# Patient Record
Sex: FEMALE | Race: BLACK OR AFRICAN AMERICAN | URBAN - METROPOLITAN AREA
[De-identification: names, ages, dates, MRNs, and addresses within clinical notes are randomized per-mention and may not be internally consistent; named-entity substitution may affect disease eponyms.]

---

## 2018-07-11 ENCOUNTER — OFFICE VISIT (OUTPATIENT)
Dept: ORTHOPEDIC SURGERY | Age: 68
End: 2018-07-11

## 2018-07-11 VITALS
WEIGHT: 233 LBS | BODY MASS INDEX: 39.78 KG/M2 | SYSTOLIC BLOOD PRESSURE: 137 MMHG | HEIGHT: 64 IN | HEART RATE: 63 BPM | DIASTOLIC BLOOD PRESSURE: 78 MMHG

## 2018-07-11 DIAGNOSIS — M54.12 CERVICAL NEURITIS: ICD-10-CM

## 2018-07-11 DIAGNOSIS — M50.30 DDD (DEGENERATIVE DISC DISEASE), CERVICAL: ICD-10-CM

## 2018-07-11 DIAGNOSIS — M48.02 CERVICAL STENOSIS OF SPINE: Primary | ICD-10-CM

## 2018-07-11 DIAGNOSIS — M50.20 DISPLACEMENT OF CERVICAL INTERVERTEBRAL DISC WITHOUT MYELOPATHY: ICD-10-CM

## 2018-07-11 DIAGNOSIS — M47.812 CERVICAL SPONDYLOSIS WITHOUT MYELOPATHY: ICD-10-CM

## 2018-07-11 PROBLEM — E66.01 SEVERE OBESITY (BMI 35.0-39.9): Status: ACTIVE | Noted: 2018-07-11

## 2018-07-11 RX ORDER — GABAPENTIN 100 MG/1
100 CAPSULE ORAL 3 TIMES DAILY
Qty: 90 CAP | Refills: 1 | Status: SHIPPED | OUTPATIENT
Start: 2018-07-11

## 2018-07-11 NOTE — MR AVS SNAPSHOT
[address] [facility] 
 
 
 [address] 706 [facility] 
[phone] Patient: Maggie Caceres MRN: [id number] :1950 Visit Information Date & Time Provider Department Dept. Phone Encounter #  
 2018 10:35 AM Jose Mar  [facility], [address] and [facility] [place] [id number] Follow-up Instructions Return in about 4 weeks (around 2018). Upcoming Health Maintenance Date Due Hepatitis C Screening 1950 DTaP/Tdap/Td series (1 - Tdap) 1969 BREAST CANCER SCRN MAMMOGRAM 1998 FOBT Q 1 YEAR AGE 50-75 1998 ZOSTER VACCINE AGE 60> 10/24/2008 GLAUCOMA SCREENING Q2Y 2013 Bone Densitometry (Dexa) Screening 2013 Pneumococcal 65+ Low/Medium Risk (1 of 2 - PCV13) 2013 MEDICARE YEARLY EXAM 2018 Influenza Age 5 to Adult 2018 Allergies as of 2018  Review Complete On: 2018 By: Jose Mar MD  
 No Known Allergies Current Immunizations  Never Reviewed No immunizations on file. Not reviewed this visit You Were Diagnosed With   
  
 Codes Comments Cervical stenosis of spine    -  Primary ICD-10-CM: M48.02 
ICD-9-CM: 723.0 Displacement of cervical intervertebral disc without myelopathy     ICD-10-CM: M50.20 ICD-9-CM: 722.0 Cervical spondylosis without myelopathy     ICD-10-CM: H76.958 ICD-9-CM: 721.0   
 DDD (degenerative disc disease), cervical     ICD-10-CM: M50.30 ICD-9-CM: 722.4 Cervical neuritis     ICD-10-CM: M54.12 
ICD-9-CM: 723.4 Vitals BP Pulse Height(growth percentile) Weight(growth percentile) BMI Smoking Status 137/78 63 5' 4\" (1.626 m) 233 lb (105.7 kg) 39.99 kg/m2 Former Smoker Vitals History BMI and BSA Data Body Mass Index Body Surface Area  
 39.99 kg/m 2 2.18 m 2 Preferred Pharmacy Pharmacy Name Phone [facility] [address] [phone] Your Updated Medication List  
  
   
This list is accurate as of 7/11/18 12:19 PM.  Always use your most recent med list. ALLEGRA PO Take  by mouth. aspirin-calcium carbonate 81 mg-300 mg calcium(777 mg) Tab 81 mg. FLONASE NA  
by Nasal route.  
  
 gabapentin 100 mg capsule Commonly known as:  NEURONTIN Take 1 Cap by mouth three (3) times daily. JANUMET PO Take  by mouth.  
  
 levothyroxine 100 mcg tablet Commonly known as:  SYNTHROID Take  by mouth Daily (before breakfast). pravastatin 20 mg tablet Commonly known as:  PRAVACHOL Take 20 mg by mouth nightly. valsartan-hydroCHLOROthiazide 80-12.5 mg per tablet Commonly known as:  DIOVAN-HCT Take 1 Tab by mouth daily. VITAMIN D3 PO Take  by mouth. Prescriptions Sent to Pharmacy Refills  
 gabapentin (NEURONTIN) 100 mg capsule 1 Sig: Take 1 Cap by mouth three (3) times daily. Class: Normal  
 Pharmacy: [place] [address] Ph #: [phone] Route: Oral  
  
We Performed the Following REFERRAL TO PHYSICAL THERAPY [DBI30 Custom] Comments:  
 DX: eval and treat CSP LUE. NO TRACTION 
HEP 
LOCATION: [facility] 2-3 visits/ 2-3 weeks Follow-up Instructions Return in about 4 weeks (around 8/8/2018). Referral Information Referral ID Referred By Referred To  
  
 [id number] Diane Crawford Not Available Visits Status Start Date End Date 1 New Request 7/11/18 7/11/19 If your referral has a status of pending review or denied, additional information will be sent to support the outcome of this decision. Introducing [facility]! Freddy Cramer introduces [employer] patient portal. Now you can access parts of your medical record, email your doctor's office, and request medication refills online. 1. In your internet browser, go to [email or web address] 2. Click on the First Time User? Click Here link in the Sign In box. You will see the New Member Sign Up page. 3. Enter your [employer] Access Code exactly as it appears below. You will not need to use this code after youve completed the sign-up process. If you do not sign up before the expiration date, you must request a new code. · [employer] Access Code: [email or web address] Expires: 10/8/2018  2:43 PM 
 
4. Enter the last four digits of your Social Security Number (xxxx) and Date of Birth (mm/dd/yyyy) as indicated and click Submit. You will be taken to the next sign-up page. 5. Create a [facility].t ID. This will be your [employer].com login ID and cannot be changed, so think of one that is secure and easy to remember. 6. Create a [employer] password. You can change your password at any time. 7. Enter your Password Reset Question and Answer. This can be used at a later time if you forget your password. 8. Enter your e-mail address. You will receive e-mail notification when new information is available in [address] 9. Click Sign Up. You can now view and download portions of your medical record. 10. Click the Download Summary menu link to download a portable copy of your medical information. If you have questions, please visit the Frequently Asked Questions section of the [employer] website. Remember, [employer] is NOT to be used for urgent needs. For medical emergencies, dial 911. Now available from your iPhone and Android! Please provide this summary of care documentation to your next provider. Your primary care clinician is listed as Meme Borges. If you have any questions after today's visit, please call [phone].

## 2018-07-11 NOTE — PROGRESS NOTES
[facility]  [address], [address]  Phone: [phone]  Fax: [phone]        INITIAL CONSULTATION      HISTORY OF PRESENT ILLNESS:  Afia Bradford is a 69 y.o. female whom is self-referred secondary to a recurrence of neck pain intermittently extending into the LUE into all digits since April or May. She rates her pain 6/10. She was last seen by me 8/25/15 with c/o neck pain into the LUE into digits 4 and 5. At that time, her pain was well relieved with Neurontin. She was scheduled to f/u in 6 months time but failed to do so. She denies specific injury or trauma. She denies a hx of cervical surgery. She cannot recall if she has had PT. She treated with Tylenol extra strength prn with some relief. PmHx The patient has a history of DM and reports blood sugars are well controlled, consistently remaining below 200. Pt denies dropping things or loss of balance. Pt denies change in bowel or bladder habits. Pt denies fever, weight loss, or skin changes. The patient is RHD. Lumbar spine MRI dated 8/21/17 reviewed. Per report, severe multilevel spondylosis worse at C3-C4 where there is mildly progressive high-grade central canal and bilateral foraminal stenosis. No cord signal abnormality. Additional high-grade foraminal stenosis on the right at C4-C5, bilaterally at C5-C6, and on the left at C6-C7 as well as additional areas of moderate foraminal stenosis as discussed.  reviewed. Body mass index is 39.99 kg/(m^2).     PCP: Roman Pringle MD    Past Medical History:   Diagnosis Date    Arthritis     Asthma     Cervical radiculopathy     Diabetes ([facility] [address]     Glaucoma     Hypercholesterolemia     Hypertension     Neck pain     Thyroid disease           Past Surgical History:   Procedure Laterality Date    HX HYSTERECTOMY      HX OTHER SURGICAL Bilateral     Cataract surgery    HX TUBAL LIGATION           Social History   Substance Use Topics    Smoking status: Former Smoker     Quit date: 1/1/1997    Smokeless tobacco: Never Used    Alcohol use No     Work status: The patient is N/A. Marital status: The patient is . Current Outpatient Prescriptions   Medication Sig Dispense Refill    cholecalciferol, vitamin D3, (VITAMIN D3 PO) Take  by mouth.  aspirin-calcium carbonate 81 mg-300 mg calcium(777 mg) tab 81 mg.      gabapentin (NEURONTIN) 100 mg capsule Take 1 Cap by mouth three (3) times daily. 90 Cap 1    valsartan-hydrochlorothiazide (DIOVAN-HCT) 80-12.5 mg per tablet Take 1 Tab by mouth daily.  pravastatin (PRAVACHOL) 20 mg tablet Take 20 mg by mouth nightly.  levothyroxine (SYNTHROID) 100 mcg tablet Take  by mouth Daily (before breakfast).  SITAGLIPTIN PHOS/METFORMIN HCL (JANUMET PO) Take  by mouth.  FLUTICASONE PROPIONATE (FLONASE NA) by Nasal route.  FEXOFENADINE HCL (ALLEGRA PO) Take  by mouth. No Known Allergies         Family History   Problem Relation Age of Onset    COPD Other     Pulmonary Fibrosis Other          REVIEW OF SYSTEMS  Constitutional symptoms: Negative  Eyes: Negative  Ears, Nose, Throat, and Mouth: Negative  Cardiovascular: Negative  Respiratory: Negative  Genitourinary: Negative  Integumentary (Skin and/or breast): Negative  Musculoskeletal: Positive for neck pain, intermittent LUE pain into all digits of the left hand  Extremities: Negative for edema. Endocrine/Rheumatologic: Negative  Hematologic/Lymphatic: Negative  Allergic/Immunologic: Negative  Psychiatric: Negative       PHYSICAL EXAMINATION    Visit Vitals    /78    Pulse 63    Ht 5' 4\" (1.626 m)    Wt 105.7 kg (233 lb)    BMI 39.99 kg/m2       CONSTITUTIONAL: NAD, A&O x 3  HEART: Regular rate and rhythm  ABDOMEN: Positive bowel sounds, soft, nontender, and nondistended  LUNGS: Clear to auscultation bilaterally. SKIN: Negative for rash.   RANGE OF MOTION: The patient has full passive range of motion in all four extremities. SENSATION: Sensation is intact to light touch throughout. MOTOR:   Straight Leg Raise: Negative, bilateral  Grey: Negative, bilateral  Tandem Gait: Neg. Deep tendon reflexes are 2+ at the brachioradialis, biceps, and triceps. Deep tendon reflexes are 2+ at the knees and ankles bilaterally. Shoulder AB/Flex Elbow Flex Wrist Ext Elbow Ext Wrist Flex Hand Intrin Tone   Right +4/5 +4/5 +4/5 +4/5 +4/5 +4/5 +4/5   Left +4/5 +4/5 +4/5 +4/5 +4/5 +4/5 +4/5              Hip Flex Knee Ext Knee Flex Ankle DF GTE Ankle PF Tone   Right +4/5 +4/5 +4/5 +4/5 +4/5 +4/5 +4/5   Left +4/5 +4/5 +4/5 +4/5 +4/5 +4/5 +4/5         ASSESSMENT   Diagnoses and all orders for this visit:    1. Cervical stenosis of spine  -     REFERRAL TO PHYSICAL THERAPY    2. Displacement of cervical intervertebral disc without myelopathy  -     REFERRAL TO PHYSICAL THERAPY    3. Cervical spondylosis without myelopathy  -     REFERRAL TO PHYSICAL THERAPY    4. DDD (degenerative disc disease), cervical  -     REFERRAL TO PHYSICAL THERAPY    5. Cervical neuritis  -     REFERRAL TO PHYSICAL THERAPY    Other orders  -     gabapentin (NEURONTIN) 100 mg capsule; Take 1 Cap by mouth three (3) times daily. IMPRESSIONS/RECOMMENDATIONS:  Patient presents today with a recurrence of chronic neck pain intermittently extending into the LUE into all digits x a few months. I will restart her on Neurontin 100 mg TID. Patient advised to call the office if intolerant to medication. Patient is neurologically intact. Multiple treatment options were discussed. Patient is not interested in surgical intervention at this time. I will refer her to physical therapy with an emphasis on HEP (No traction). I will see the patient back in 1 month's time or earlier if needed. Written by Raul Bucio, as dictated by Anuja Mtz MD  I examined the patient, reviewed and agree with the note.

## 2018-08-14 ENCOUNTER — OFFICE VISIT (OUTPATIENT)
Dept: ORTHOPEDIC SURGERY | Age: 68
End: 2018-08-14

## 2018-08-14 VITALS
HEIGHT: 64 IN | OXYGEN SATURATION: 97 % | HEART RATE: 67 BPM | BODY MASS INDEX: 40.63 KG/M2 | WEIGHT: 238 LBS | SYSTOLIC BLOOD PRESSURE: 134 MMHG | DIASTOLIC BLOOD PRESSURE: 66 MMHG

## 2018-08-14 DIAGNOSIS — M50.30 DDD (DEGENERATIVE DISC DISEASE), CERVICAL: ICD-10-CM

## 2018-08-14 DIAGNOSIS — M48.02 CERVICAL STENOSIS OF SPINE: Primary | ICD-10-CM

## 2018-08-14 DIAGNOSIS — M47.812 CERVICAL SPONDYLOSIS WITHOUT MYELOPATHY: ICD-10-CM

## 2018-08-14 DIAGNOSIS — M50.20 DISPLACEMENT OF CERVICAL INTERVERTEBRAL DISC WITHOUT MYELOPATHY: ICD-10-CM

## 2018-08-14 RX ORDER — GABAPENTIN 100 MG/1
100 CAPSULE ORAL 3 TIMES DAILY
Qty: 270 CAP | Refills: 1 | Status: SHIPPED | OUTPATIENT
Start: 2018-08-14

## 2018-08-14 RX ORDER — GUAIFENESIN 100 MG/5ML
81 LIQUID (ML) ORAL DAILY
COMMUNITY

## 2018-08-14 NOTE — MR AVS SNAPSHOT
[address] [facility] 
 
 
 [address] 200 [facility] 
[phone] Patient: Dusty Lemos MRN: [id number] :1950 Visit Information Date & Time Provider Department Dept. Phone Encounter #  
 2018  9:50 AM Frederick Gonzales MD 4 [facility], [address] and [facility] [place] [phone] Follow-up Instructions Return in about 6 months (around 2019). Upcoming Health Maintenance Date Due Hepatitis C Screening 1950 DTaP/Tdap/Td series (1 - Tdap) 1969 BREAST CANCER SCRN MAMMOGRAM 1998 FOBT Q 1 YEAR AGE 50-75 1998 ZOSTER VACCINE AGE 60> 10/24/2008 GLAUCOMA SCREENING Q2Y 2013 Bone Densitometry (Dexa) Screening 2013 Pneumococcal 65+ Low/Medium Risk (1 of 2 - PCV13) 2013 MEDICARE YEARLY EXAM 2018 Influenza Age 5 to Adult 2018 Allergies as of 2018  Review Complete On: 2018 By: Frederick Gonzales MD  
 No Known Allergies Current Immunizations  Never Reviewed No immunizations on file. Not reviewed this visit You Were Diagnosed With   
  
 Codes Comments Cervical stenosis of spine    -  Primary ICD-10-CM: M48.02 
ICD-9-CM: 723.0 Displacement of cervical intervertebral disc without myelopathy     ICD-10-CM: M50.20 ICD-9-CM: 722.0 Cervical spondylosis without myelopathy     ICD-10-CM: F37.769 ICD-9-CM: 721.0   
 DDD (degenerative disc disease), cervical     ICD-10-CM: M50.30 ICD-9-CM: 722.4 Vitals BP Pulse Height(growth percentile) Weight(growth percentile) SpO2 BMI  
 134/66 67 5' 4\" (1.626 m) 238 lb (108 kg) 97% 40.85 kg/m2 Smoking Status Former Smoker BMI and BSA Data Body Mass Index Body Surface Area  
 40.85 kg/m 2 2.21 m 2 Preferred Pharmacy Pharmacy Name Phone  [address], [address] [phone] Your Updated Medication List  
  
   
This list is accurate as of 8/14/18 10:17 AM.  Always use your most recent med list. ALLEGRA PO Take  by mouth. aspirin 81 mg chewable tablet Take 81 mg by mouth daily. aspirin-calcium carbonate 81 mg-300 mg calcium(777 mg) Tab 81 mg. FLONASE NA  
by Nasal route. * gabapentin 100 mg capsule Commonly known as:  NEURONTIN Take 1 Cap by mouth three (3) times daily. * gabapentin 100 mg capsule Commonly known as:  NEURONTIN Take 1 Cap by mouth three (3) times daily. JANUMET PO Take  by mouth.  
  
 levothyroxine 100 mcg tablet Commonly known as:  SYNTHROID Take  by mouth Daily (before breakfast). pravastatin 20 mg tablet Commonly known as:  PRAVACHOL Take 20 mg by mouth nightly. valsartan-hydroCHLOROthiazide 80-12.5 mg per tablet Commonly known as:  DIOVAN-HCT Take 1 Tab by mouth daily. VITAMIN D3 PO Take  by mouth. * Notice: This list has 2 medication(s) that are the same as other medications prescribed for you. Read the directions carefully, and ask your doctor or other care provider to review them with you. Prescriptions Sent to Pharmacy Refills  
 gabapentin (NEURONTIN) 100 mg capsule 1 Sig: Take 1 Cap by mouth three (3) times daily. Class: Normal  
 Pharmacy: [address] [place] [address], [address] [place] [address] #: [phone] Route: Oral  
  
Follow-up Instructions Return in about 6 months (around 2/14/2019). Introducing [facility]! Esmer Valderrama introduces [employer] patient portal. Now you can access parts of your medical record, email your doctor's office, and request medication refills online. 1. In your internet browser, go to [email or web address] 2. Click on the First Time User? Click Here link in the Sign In box. You will see the New Member Sign Up page. 3. Enter your [employer] Access Code exactly as it appears below. You will not need to use this code after youve completed the sign-up process. If you do not sign up before the expiration date, you must request a new code. · [employer] Access Code: [email or web address] Expires: 10/8/2018  2:43 PM 
 
4. Enter the last four digits of your Social Security Number (xxxx) and Date of Birth (mm/dd/yyyy) as indicated and click Submit. You will be taken to the next sign-up page. 5. Create a [employer] ID. This will be your [employer] login ID and cannot be changed, so think of one that is secure and easy to remember. 6. Create a [employer] password. You can change your password at any time. 7. Enter your Password Reset Question and Answer. This can be used at a later time if you forget your password. 8. Enter your e-mail address. You will receive e-mail notification when new information is available in [address] 9. Click Sign Up. You can now view and download portions of your medical record. 10. Click the Download Summary menu link to download a portable copy of your medical information. If you have questions, please visit the Frequently Asked Questions section of the [employer] website. Remember, [employer] is NOT to be used for urgent needs. For medical emergencies, dial 911. Now available from your iPhone and Android! Please provide this summary of care documentation to your next provider. Your primary care clinician is listed as Anjelica Hansen. If you have any questions after today's visit, please call [phone].

## 2018-08-14 NOTE — PROGRESS NOTES
[facility]  [address], [place] [address]  Phone: [phone]  Fax: [phone]        PROGRESS NOTE      HISTORY OF PRESENT ILLNESS:  The patient is a 69 y.o. female and was seen today for follow up of recurrence of neck pain intermittently extending into the LUE into all digits since April or May. She was last seen by me 8/25/15 with c/o neck pain into the LUE into digits 4 and 5. At that time, her pain was well relieved with Neurontin. She was scheduled to f/u in 6 months time but failed to do so. She denies specific injury or trauma. She denies a hx of cervical surgery. She cannot recall if she has had PT. She treated with Tylenol extra strength prn with some relief. PmHx The patient has a history of DM and reports blood sugars are well controlled, consistently remaining below 200. Pt denies dropping things or loss of balance. Pt denies change in bowel or bladder habits. Pt denies fever, weight loss, or skin changes. The patient is RHD. Lumbar spine MRI dated 8/21/17 reviewed. Per report, severe multilevel spondylosis worse at C3-C4 where there is mildly progressive high-grade central canal and bilateral foraminal stenosis. No cord signal abnormality. Additional high-grade foraminal stenosis on the right at C4-C5, bilaterally at C5-C6, and on the left at C6-C7 as well as additional areas of moderate foraminal stenosis as discussed. At her last clinical appointment, patient presented with a recurrence of chronic neck pain intermittently extending into the LUE into all digits x a few months. I restarted her on Neurontin 100 mg TID. I referred her to physical therapy with an emphasis on HEP (No traction). The patient returns today with pain localized to her neck. She denies radicular symptoms into the LUE at this time. She rates her pain 2/10, a decrease from her last visit (6/10). She reports that her neck pain is also more tolerable.  She is tolerating Neurontin 100 mg TID with some relief. She completed PT with good benefit. She is completing her HEP daily. She reports occasional mild LOB but states it is not progressive.  reviewed. Body mass index is 40.85 kg/(m^2). PCP: Rosina Cortes MD      Past Medical History:   Diagnosis Date    Arthritis     Asthma     Cervical radiculopathy     Diabetes ([address]     Glaucoma     Hypercholesterolemia     Hypertension     Neck pain     Thyroid disease         Social History     Social History    Marital status:      Spouse name: N/A    Number of children: N/A    Years of education: N/A     Occupational History    Not on file. Social History Main Topics    Smoking status: Former Smoker     Quit date: 1/1/1997    Smokeless tobacco: Never Used    Alcohol use No    Drug use: Not on file    Sexual activity: Not on file     Other Topics Concern    Not on file     Social History Narrative       Current Outpatient Prescriptions   Medication Sig Dispense Refill    aspirin 81 mg chewable tablet Take 81 mg by mouth daily.  gabapentin (NEURONTIN) 100 mg capsule Take 1 Cap by mouth three (3) times daily. 270 Cap 1    cholecalciferol, vitamin D3, (VITAMIN D3 PO) Take  by mouth.  aspirin-calcium carbonate 81 mg-300 mg calcium(777 mg) tab 81 mg.      gabapentin (NEURONTIN) 100 mg capsule Take 1 Cap by mouth three (3) times daily. 90 Cap 1    valsartan-hydrochlorothiazide (DIOVAN-HCT) 80-12.5 mg per tablet Take 1 Tab by mouth daily.  pravastatin (PRAVACHOL) 20 mg tablet Take 20 mg by mouth nightly.  levothyroxine (SYNTHROID) 100 mcg tablet Take  by mouth Daily (before breakfast).  SITAGLIPTIN PHOS/METFORMIN HCL (JANUMET PO) Take  by mouth.  FLUTICASONE PROPIONATE (FLONASE NA) by Nasal route.  FEXOFENADINE HCL (ALLEGRA PO) Take  by mouth.          No Known Allergies       PHYSICAL EXAMINATION    Visit Vitals    /66    Pulse 67    Ht 5' 4\" (1.626 m)  Wt 108 kg (238 lb)    SpO2 97%    BMI 40.85 kg/m2       CONSTITUTIONAL: NAD, A&O x 3  SENSATION: Intact to light touch throughout  NEURO: Anup's is negative bilaterally. RANGE OF MOTION: The patient has full passive range of motion in all four extremities. Shoulder AB/Flex Elbow Flex Wrist Ext Elbow Ext Wrist Flex Hand Intrin Tone   Right +4/5 +4/5 +4/5 +4/5 +4/5 +4/5 +4/5   Left +4/5 +4/5 +4/5 +4/5 +4/5 +4/5 +4/5                 ASSESSMENT   Diagnoses and all orders for this visit:    1. Cervical stenosis of spine    2. Displacement of cervical intervertebral disc without myelopathy    3. Cervical spondylosis without myelopathy    4. DDD (degenerative disc disease), cervical    Other orders  -     gabapentin (NEURONTIN) 100 mg capsule; Take 1 Cap by mouth three (3) times daily. IMPRESSION AND PLAN:  Patient wished to continue her current treatment. I provided her with refills of her Neurontin 100 mg TID. Patient is neurologically intact. I recommend she continue with her HEP daily. Patient is not interested in surgical intervention at this time. I will see the patient back in 6 month's time or earlier if needed. Written by Sandra Lacy, as dictated by Radha Carson MD  I examined the patient, reviewed and agree with the note.

## 2019-02-15 ENCOUNTER — OFFICE VISIT (OUTPATIENT)
Dept: ORTHOPEDIC SURGERY | Age: 69
End: 2019-02-15

## 2019-02-15 VITALS
WEIGHT: 243 LBS | RESPIRATION RATE: 16 BRPM | OXYGEN SATURATION: 97 % | BODY MASS INDEX: 41.48 KG/M2 | DIASTOLIC BLOOD PRESSURE: 87 MMHG | HEART RATE: 69 BPM | HEIGHT: 64 IN | SYSTOLIC BLOOD PRESSURE: 138 MMHG | TEMPERATURE: 96.1 F

## 2019-02-15 DIAGNOSIS — M47.812 CERVICAL SPONDYLOSIS WITHOUT MYELOPATHY: ICD-10-CM

## 2019-02-15 DIAGNOSIS — M50.30 DDD (DEGENERATIVE DISC DISEASE), CERVICAL: ICD-10-CM

## 2019-02-15 DIAGNOSIS — M50.20 DISPLACEMENT OF CERVICAL INTERVERTEBRAL DISC WITHOUT MYELOPATHY: ICD-10-CM

## 2019-02-15 DIAGNOSIS — M25.562 ACUTE PAIN OF LEFT KNEE: Primary | ICD-10-CM

## 2019-02-15 DIAGNOSIS — M48.02 CERVICAL STENOSIS OF SPINE: ICD-10-CM

## 2019-02-15 RX ORDER — GABAPENTIN 100 MG/1
100 CAPSULE ORAL 3 TIMES DAILY
Qty: 270 CAP | Refills: 1 | Status: SHIPPED | OUTPATIENT
Start: 2019-02-15

## 2019-02-15 NOTE — PROGRESS NOTES
1. Have you been to the ER, urgent care clinic since your last visit? Hospitalized since your last visit? No 
 
2. Have you seen or consulted any other health care providers outside of the [address] [place] [address] since your last visit? Include any pap smears or colon screening. Yes When: 1/24/19, colonoscopy, [facility]

## 2019-02-15 NOTE — PROGRESS NOTES
[facility] 
[address] Phone: [phone] Fax: [phone] PROGRESS NOTE HISTORY OF PRESENT ILLNESS: 
The patient is a 77 y.o. female and was seen today for follow up of pain localized to her neck. She was initially seen for neck pain intermittently extending into the LUE into all digits since April or May. She was last seen by me 8/25/15 with c/o neck pain into the LUE into digits 4 and 5. At that time, her pain was well relieved with Neurontin. She was scheduled to f/u in 6 months time but failed to do so. She denies specific injury or trauma. She denies a hx of cervical surgery. She completed PT with good benefit. She treated with Tylenol extra strength prn with some relief. PmHx The patient has a history of DM and reports blood sugars are well controlled, consistently remaining below 200. Pt denies dropping things or loss of balance. Pt denies change in bowel or bladder habits. Pt denies fever, weight loss, or skin changes. The patient is RHD. Lumbar spine MRI dated 8/21/17 reviewed. Per report, severe multilevel spondylosis worse at C3-C4 where there is mildly progressive high-grade central canal and bilateral foraminal stenosis. No cord signal abnormality. Additional high-grade foraminal stenosis on the right at C4-C5, bilaterally at C5-C6, and on the left at C6-C7 as well as additional areas of moderate foraminal stenosis as discussed. At her last clinical appointment, patient wished to continue her current treatment. I provided her with refills of her Neurontin 100 mg TID. The patient returns today with pain location and distribution remain unchanged. She rates her pain 0-2/10, consistent with her last visit (2/10). She additionally endorses progressive left knee pain x3-4 months at this time. Last seen by me 8/14/18. Pt admits inconsistency with her HEP (1-2x/week). She is compliant with Neurontin 100 mg TID.  She reports occasional mild LOB but states it is not progressive. Pt denies falls.  reviewed. Body mass index is 41.71 kg/m². PCP: Ta Lopez MD 
 
 
Past Medical History:  
Diagnosis Date  Arthritis  Asthma  Cervical radiculopathy  Diabetes ([address]  Glaucoma  Hypercholesterolemia  Hypertension  Neck pain  Thyroid disease Social History Socioeconomic History  Marital status:  Spouse name: Not on file  Number of children: Not on file  Years of education: Not on file  Highest education level: Not on file Social Needs  Financial resource strain: Not on file  Food insecurity - worry: Not on file  Food insecurity - inability: Not on file  Transportation needs - medical: Not on file  Transportation needs - non-medical: Not on file Occupational History  Not on file Tobacco Use  Smoking status: Former Smoker Last attempt to quit: 1997 Years since quittin.1  Smokeless tobacco: Never Used Substance and Sexual Activity  Alcohol use: No  
 Drug use: Not on file  Sexual activity: Not on file Other Topics Concern [address] Service Not Asked  Blood Transfusions Not Asked  Caffeine Concern Not Asked  Occupational Exposure Not Asked Fabio Marek Hazards Not Asked  Sleep Concern Not Asked  Stress Concern Not Asked  Weight Concern Not Asked  Special Diet Not Asked  Back Care Not Asked  Exercise Not Asked  Bike Helmet Not Asked  Seat Belt Not Asked  Self-Exams Not Asked Social History Narrative  Not on file Current Outpatient Medications Medication Sig Dispense Refill  aspirin 81 mg chewable tablet Take 81 mg by mouth daily.  gabapentin (NEURONTIN) 100 mg capsule Take 1 Cap by mouth three (3) times daily. 270 Cap 1  cholecalciferol, vitamin D3, (VITAMIN D3 PO) Take  by mouth.  valsartan-hydrochlorothiazide (DIOVAN-HCT) 80-12.5 mg per tablet Take 1 Tab by mouth daily.  pravastatin (PRAVACHOL) 20 mg tablet Take 20 mg by mouth nightly.  levothyroxine (SYNTHROID) 100 mcg tablet Take  by mouth Daily (before breakfast).  SITAGLIPTIN PHOS/METFORMIN HCL (JANUMET PO) Take  by mouth.  FLUTICASONE PROPIONATE (FLONASE NA) by Nasal route.  FEXOFENADINE HCL (ALLEGRA PO) Take  by mouth.  aspirin-calcium carbonate 81 mg-300 mg calcium(777 mg) tab 81 mg.    
 gabapentin (NEURONTIN) 100 mg capsule Take 1 Cap by mouth three (3) times daily. 90 Cap 1 No Known Allergies PHYSICAL EXAMINATION Visit Vitals /87 Pulse 69 Temp 96.1 °F (35.6 °C) (Oral) Resp 16 Ht 5' 4\" (1.626 m) Wt 243 lb (110.2 kg) SpO2 97% BMI 41.71 kg/m² CONSTITUTIONAL: NAD, A&O x 3 SENSATION: Intact to light touch throughout. Mild tenderness to palpation of medial joint line of left knee. NEURO: Anup's is negative bilaterally. RANGE OF MOTION: The patient has full passive range of motion in all four extremities. Tandem gait: No LOB Shoulder AB/Flex Elbow Flex Wrist Ext Elbow Ext Wrist Flex Hand Intrin Tone Right +4/5 +4/5 +4/5 +4/5 +4/5 +4/5 +4/5 Left +4/5 +4/5 +4/5 +4/5 +4/5 +4/5 +4/5 ASSESSMENT Diagnoses and all orders for this visit: 
 
1. Acute pain of left knee 2. Cervical stenosis of spine 3. DDD (degenerative disc disease), cervical 
 
4. Cervical spondylosis without myelopathy 5. Displacement of cervical intervertebral disc without myelopathy IMPRESSION AND PLAN: 
Patient wished to continue her current treatment. I provided her with refills of Neurontin 100 mg TID. I advised patient to monitor her LOB and notify the office if it worsens. I recommend she increase the frequency of HEP to daily.  I offered to refer her to an orthopedist for evaluation of her left knee pain, but she declined. Patient is neurologically intact. I will see the patient back in 6 month's time or earlier if needed. Written by Elizabeth Sultana, as dictated by Gordo Gonzalez MD 
I examined the patient, reviewed and agree with the note.

## 2019-08-13 ENCOUNTER — OFFICE VISIT (OUTPATIENT)
Dept: ORTHOPEDIC SURGERY | Age: 69
End: 2019-08-13

## 2019-08-13 VITALS
TEMPERATURE: 95.8 F | OXYGEN SATURATION: 97 % | DIASTOLIC BLOOD PRESSURE: 75 MMHG | HEART RATE: 58 BPM | HEIGHT: 64 IN | WEIGHT: 244 LBS | SYSTOLIC BLOOD PRESSURE: 179 MMHG | BODY MASS INDEX: 41.66 KG/M2

## 2019-08-13 DIAGNOSIS — M48.02 CERVICAL STENOSIS OF SPINE: Primary | ICD-10-CM

## 2019-08-13 DIAGNOSIS — M47.812 CERVICAL SPONDYLOSIS WITHOUT MYELOPATHY: ICD-10-CM

## 2019-08-13 DIAGNOSIS — M50.30 DDD (DEGENERATIVE DISC DISEASE), CERVICAL: ICD-10-CM

## 2019-08-13 RX ORDER — GABAPENTIN 100 MG/1
100 CAPSULE ORAL 3 TIMES DAILY
Qty: 270 CAP | Refills: 1 | Status: SHIPPED | OUTPATIENT
Start: 2019-08-13

## 2019-08-13 RX ORDER — LOSARTAN POTASSIUM 25 MG/1
TABLET ORAL
COMMUNITY
Start: 2019-03-23

## 2019-08-13 NOTE — LETTER
8/13/19 Patient: Faroese Cargo YOB: 1950 Date of Visit: 8/13/2019 Lito Sharp MD 
[address]  [address] [facility] [address] VIA Facsimile: [phone] Dear Lito Sharp MD, Thank you for referring Ms. Jamee Tam to [address] [place] [address] for evaluation. My notes for this consultation are attached. If you have questions, please do not hesitate to call me. I look forward to following your patient along with you. Sincerely, Krupa Sellers MD

## 2019-08-13 NOTE — PROGRESS NOTES
[facility]  [address]  Phone: [phone]  Fax: [phone]        PROGRESS NOTE      HISTORY OF PRESENT ILLNESS:  The patient is a 77 y.o. female and was seen today for follow up of pain localized to her neck. She was initially seen for neck pain intermittently extending into the LUE into all digits since April or May. She was last seen by me 8/25/15 with c/o neck pain into the LUE into digits 4 and 5. At that time, her pain was well relieved with Neurontin. She was scheduled to f/u in 6 months time but failed to do so. She denies specific injury or trauma. She denies a hx of cervical surgery. She completed PT with good benefit. She treated with Tylenol extra strength prn with some relief. PmHx The patient has a history of DM and reports blood sugars are well controlled, consistently remaining below 200. Pt denies dropping things or loss of balance. Pt denies change in bowel or bladder habits. Pt denies fever, weight loss, or skin changes. The patient is RHD. Lumbar spine MRI dated 8/21/17 reviewed. Per report, severe multilevel spondylosis worse at C3-C4 where there is mildly progressive high-grade central canal and bilateral foraminal stenosis. No cord signal abnormality. Additional high-grade foraminal stenosis on the right at C4-C5, bilaterally at C5-C6, and on the left at C6-C7 as well as additional areas of moderate foraminal stenosis as discussed. At her last clinical appointment, patient wished to continue her current treatment. I provided her with refills of Neurontin 100 mg TID. I advised patient to monitor her LOB and notify the office if it worsens. I recommended she increase the frequency of HEP to daily. I offered to refer her to an orthopedist for evaluation of her left knee pain, but she declined. The patient returns today with pain location and distribution remain unchanged. She rates her pain 0/10, previously 0-2/10.  She is complaint with Neurontin 100 mg TID. Patient reports she has been taking OTC Naprosyn every day/every other day. Patient is performing her HEP inconsistently. She continues to report occasional mild LOB. Pt denies falls.  reviewed. Body mass index is 41.88 kg/m².       PCP: Chuck Correa MD      Past Medical History:   Diagnosis Date    Arthritis     Asthma     Cervical radiculopathy     Diabetes ([address]     Glaucoma     Hypercholesterolemia     Hypertension     Neck pain     Thyroid disease         Social History     Socioeconomic History    Marital status:      Spouse name: Not on file    Number of children: Not on file    Years of education: Not on file    Highest education level: Not on file   Occupational History    Not on file   Social Needs    Financial resource strain: Not on file    Food insecurity:     Worry: Not on file     Inability: Not on file    Transportation needs:     Medical: Not on file     Non-medical: Not on file   Tobacco Use    Smoking status: Former Smoker     Last attempt to quit: 1997     Years since quittin.6    Smokeless tobacco: Never Used   Substance and Sexual Activity    Alcohol use: No    Drug use: Not on file    Sexual activity: Not on file   Lifestyle    Physical activity:     Days per week: Not on file     Minutes per session: Not on file    Stress: Not on file   Relationships    Social connections:     Talks on phone: Not on file     Gets together: Not on file     Attends [employer] service: Not on file     Active member of club or organization: Not on file     Attends meetings of clubs or organizations: Not on file     Relationship status: Not on file    Intimate partner violence:     Fear of current or ex partner: Not on file     Emotionally abused: Not on file     Physically abused: Not on file     Forced sexual activity: Not on file   Other Topics Concern   [address] Service Not Asked    Blood Transfusions Not Asked   Yeni Vaz Caffeine Concern Not Asked    Occupational Exposure Not Asked    Hobby Hazards Not Asked    Sleep Concern Not Asked    Stress Concern Not Asked    Weight Concern Not Asked    Special Diet Not Asked    Back Care Not Asked    Exercise Not Asked    Bike Helmet Not Asked    Seat Belt Not Asked    Self-Exams Not Asked   Social History Narrative    Not on file       Current Outpatient Medications   Medication Sig Dispense Refill    losartan (COZAAR) 25 mg tablet TAKE 1 TABLET EVERY DAY      NAPROXEN PO Take  by mouth.  aspirin 81 mg chewable tablet Take 81 mg by mouth daily.  cholecalciferol, vitamin D3, (VITAMIN D3 PO) Take  by mouth.  gabapentin (NEURONTIN) 100 mg capsule Take 1 Cap by mouth three (3) times daily. 90 Cap 1    pravastatin (PRAVACHOL) 20 mg tablet Take 20 mg by mouth nightly.  levothyroxine (SYNTHROID) 100 mcg tablet Take  by mouth Daily (before breakfast).  SITAGLIPTIN PHOS/METFORMIN HCL (JANUMET PO) Take  by mouth.  FLUTICASONE PROPIONATE (FLONASE NA) by Nasal route.  FEXOFENADINE HCL (ALLEGRA PO) Take  by mouth.  gabapentin (NEURONTIN) 100 mg capsule Take 1 Cap by mouth three (3) times daily. 270 Cap 1    gabapentin (NEURONTIN) 100 mg capsule Take 1 Cap by mouth three (3) times daily. 270 Cap 1    aspirin-calcium carbonate 81 mg-300 mg calcium(777 mg) tab 81 mg.      valsartan-hydrochlorothiazide (DIOVAN-HCT) 80-12.5 mg per tablet Take 1 Tab by mouth daily. No Known Allergies       PHYSICAL EXAMINATION    Visit Vitals  /75 (BP 1 Location: Left arm, BP Patient Position: Sitting)   Pulse (!) 58   Temp 95.8 °F (35.4 °C) (Oral)   Ht 5' 4\" (1.626 m)   Wt 244 lb (110.7 kg)   SpO2 97%   BMI 41.88 kg/m²       CONSTITUTIONAL: NAD, A&O x 3  SENSATION: Intact to light touch throughout  NEURO: Anup's is negative bilaterally. RANGE OF MOTION: The patient has full passive range of motion in all four extremities.   Tandem Gait: mild LOB Shoulder AB/Flex Elbow Flex Wrist Ext Elbow Ext Wrist Flex Hand Intrin Tone   Right +4/5 +4/5 +4/5 +4/5 +4/5 +4/5 +4/5   Left +4/5 +4/5 +4/5 +4/5 +4/5 +4/5 +4/5                 ASSESSMENT   Diagnoses and all orders for this visit:    1. Cervical stenosis of spine    2. DDD (degenerative disc disease), cervical    3. Cervical spondylosis without myelopathy          IMPRESSION AND PLAN:  Patient wished to continue her current treatment. I provided her with refills of Neurontin 100 mg TID. Patient reports she has been taking OTC Naprosyn every day/every other day. I did explain to the patient that taking this medication chronically may cause future medical problems and advised against it. I recommend she increase the frequency of HEP to daily. Patient is neurologically intact. I will see the patient back in 6 month's time or earlier if needed. Written by Rosalie Lopez, as dictated by Floresita Monroe MD  I examined the patient, reviewed and agree with the note.

## 2020-02-12 NOTE — PROGRESS NOTES
[facility]  [address]   Phone: [phone]  Fax: [phone]        PROGRESS NOTE      HISTORY OF PRESENT ILLNESS:  The patient is a 68 y.o. female and was seen today for follow up of pain localized to her neck. She was initially seen for neck pain intermittently extending into the LUE into all digits since April or May. She was last seen by me 8/25/15 with c/o neck pain into the LUE into digits 4 and 5. At that time, her pain was well relieved with Neurontin. She was scheduled to f/u in 6 months time but failed to do so. She denies specific injury or trauma. She denies a hx of cervical surgery. She completed PT with good benefit.  She treated with Tylenol extra strength prn with some relief. PmHx The patient has a history of DM and reports blood sugars are well controlled, consistently remaining below 200. Pt denies dropping things or loss of balance. Pt denies change in bowel or bladder habits. Pt denies fever, weight loss, or skin changes. The patient is RHD. C Spine MRI dated 8/21/17 reviewed. Per report, severe multilevel spondylosis worse at C3-C4 where there is mildly progressive high-grade central canal and bilateral foraminal stenosis. No cord signal abnormality. Additional high-grade foraminal stenosis on the right at C4-C5, bilaterally at C5-C6, and on the left at C6-C7 as well as additional areas of moderate foraminal stenosis as discussed. At her last clinical appointment, patient wished to continue her current treatment. I provided her with refills of Neurontin 100 mg TID. Patient reported taking OTC Naprosyn daily/every other day. I informed the patient that chronic use of Naprosyn may cause future medical problems and advised against it. I recommended she increase the frequency of HEP to daily.      The patient returns today with pain location and distribution remain unchanged. She rates her pain 0/10, unchanged.  She continues to deny radicular symptoms at this time. Additionally, she endorses some strain in her low back when bending over. Patient continues on Neurontin as prescribed. Pt denies dropping things or an increase in loss of balance. She denies any recent falls. She describes her symptoms as stable. She continues to be inconsistent with her daily HEP.  reviewed. Body mass index is 40.68 kg/m².     PCP: Rl Bland MD      Past Medical History:   Diagnosis Date    Arthritis     Asthma     Cervical radiculopathy     Diabetes ([facility] [address]     Glaucoma     Hypercholesterolemia     Hypertension     Neck pain     Thyroid disease         Social History     Socioeconomic History    Marital status:      Spouse name: Not on file    Number of children: Not on file    Years of education: Not on file    Highest education level: Not on file   Occupational History    Not on file   Social Needs    Financial resource strain: Not on file    Food insecurity:     Worry: Not on file     Inability: Not on file    Transportation needs:     Medical: Not on file     Non-medical: Not on file   Tobacco Use    Smoking status: Former Smoker     Last attempt to quit: 1997     Years since quittin.1    Smokeless tobacco: Never Used   Substance and Sexual Activity    Alcohol use: No    Drug use: Not on file    Sexual activity: Not on file   Lifestyle    Physical activity:     Days per week: Not on file     Minutes per session: Not on file    Stress: Not on file   Relationships    Social connections:     Talks on phone: Not on file     Gets together: Not on file     Attends Orthodox service: Not on file     Active member of club or organization: Not on file     Attends meetings of clubs or organizations: Not on file     Relationship status: Not on file    Intimate partner violence:     Fear of current or ex partner: Not on file     Emotionally abused: Not on file     Physically abused: Not on file     Forced sexual activity: Not on file   Other Topics Concern     Service Not Asked    Blood Transfusions Not Asked    Caffeine Concern Not Asked    Occupational Exposure Not Asked    Hobby Hazards Not Asked    Sleep Concern Not Asked    Stress Concern Not Asked    Weight Concern Not Asked    Special Diet Not Asked    Back Care Not Asked    Exercise Not Asked    Bike Helmet Not Asked    Seat Belt Not Asked    Self-Exams Not Asked   Social History Narrative    Not on file       Current Outpatient Medications   Medication Sig Dispense Refill    losartan (COZAAR) 25 mg tablet TAKE 1 TABLET EVERY DAY      NAPROXEN PO Take  by mouth.  gabapentin (NEURONTIN) 100 mg capsule Take 1 Cap by mouth three (3) times daily. Max Daily Amount: 300 mg. 270 Cap 1    aspirin 81 mg chewable tablet Take 81 mg by mouth daily.  cholecalciferol, vitamin D3, (VITAMIN D3 PO) Take  by mouth.  aspirin-calcium carbonate 81 mg-300 mg calcium(777 mg) tab 81 mg.      valsartan-hydrochlorothiazide (DIOVAN-HCT) 80-12.5 mg per tablet Take 1 Tab by mouth daily.  pravastatin (PRAVACHOL) 20 mg tablet Take 20 mg by mouth nightly.  levothyroxine (SYNTHROID) 100 mcg tablet Take  by mouth Daily (before breakfast).  SITAGLIPTIN PHOS/METFORMIN HCL (JANUMET PO) Take  by mouth.  FLUTICASONE PROPIONATE (FLONASE NA) by Nasal route.  FEXOFENADINE HCL (ALLEGRA PO) Take  by mouth.  gabapentin (NEURONTIN) 100 mg capsule Take 1 Cap by mouth three (3) times daily. 270 Cap 1    gabapentin (NEURONTIN) 100 mg capsule Take 1 Cap by mouth three (3) times daily. 270 Cap 1    gabapentin (NEURONTIN) 100 mg capsule Take 1 Cap by mouth three (3) times daily.  90 Cap 1       No Known Allergies       PHYSICAL EXAMINATION    Visit Vitals  /80 (BP 1 Location: Left arm, BP Patient Position: Sitting)   Pulse 60   Temp 97.6 °F (36.4 °C) (Oral)   Resp 16   Ht 5' 4\" (1.626 m)   Wt 237 lb (107.5 kg)   SpO2 97%   BMI 40.68 kg/m² CONSTITUTIONAL: NAD, A&O x 3  SENSATION: Intact to light touch throughout  NEURO: Anup's is negative bilaterally. RANGE OF MOTION: The patient has full passive range of motion in all four extremities. MOTOR:  Tandem gait: mild LOB   Shoulder AB/Flex Elbow Flex Wrist Ext Elbow Ext Wrist Flex Hand Intrin Tone   Right +4/5 +4/5 +4/5 +4/5 +4/5 +4/5 +4/5   Left +4/5 +4/5 +4/5 +4/5 +4/5 +4/5 +4/5     ASSESSMENT   Diagnoses and all orders for this visit:    1. Cervical stenosis of spine  -     gabapentin (NEURONTIN) 100 mg capsule; Take 1 Cap by mouth three (3) times daily. Max Daily Amount: 300 mg.    2. Cervical spondylosis without myelopathy    3. DDD (degenerative disc disease), cervical      IMPRESSION AND PLAN:  Patient returns to the office today with c/o neck pain. Multiple treatment options were discussed  Overall, she describes her symptoms as manageable. I offered to refer her to PT to increase the flexibility for her low back, pt declined. Patient wished to continue her current treatment. I refilled her Neurontin 100 mg TID; however, I instructed her to decrease her Neurontin as tolerated. Patient is neurologically intact. I recommend she increase the frequency of HEP to daily. I will see the patient back in 6 month's time or earlier if needed. Written by Sheela Davila, as dictated by Odilon Monique MD  I examined the patient, reviewed and agree with the note.

## 2020-02-14 ENCOUNTER — OFFICE VISIT (OUTPATIENT)
Dept: ORTHOPEDIC SURGERY | Age: 70
End: 2020-02-14

## 2020-02-14 VITALS
RESPIRATION RATE: 16 BRPM | SYSTOLIC BLOOD PRESSURE: 116 MMHG | OXYGEN SATURATION: 97 % | TEMPERATURE: 97.6 F | WEIGHT: 237 LBS | HEART RATE: 60 BPM | BODY MASS INDEX: 40.46 KG/M2 | DIASTOLIC BLOOD PRESSURE: 80 MMHG | HEIGHT: 64 IN

## 2020-02-14 DIAGNOSIS — M50.30 DDD (DEGENERATIVE DISC DISEASE), CERVICAL: ICD-10-CM

## 2020-02-14 DIAGNOSIS — M47.812 CERVICAL SPONDYLOSIS WITHOUT MYELOPATHY: ICD-10-CM

## 2020-02-14 DIAGNOSIS — M48.02 CERVICAL STENOSIS OF SPINE: Primary | ICD-10-CM

## 2020-02-14 RX ORDER — GABAPENTIN 100 MG/1
100 CAPSULE ORAL 3 TIMES DAILY
Qty: 270 CAP | Refills: 1 | Status: SHIPPED | OUTPATIENT
Start: 2020-02-14

## 2020-02-14 RX ORDER — GABAPENTIN 100 MG/1
100 CAPSULE ORAL 3 TIMES DAILY
Qty: 270 CAP | Refills: 1 | Status: SHIPPED | OUTPATIENT
Start: 2020-02-14 | End: 2020-02-14

## 2020-02-14 NOTE — LETTER
2/14/20 Patient: Terra Walton YOB: 1950 Date of Visit: 2/14/2020 Bassam Sheridan MD 
[address]  [address] [place] [id number] VIA Facsimile: [phone] Dear Bassam Sheridan MD, Thank you for referring Ms. Tenisha Galvan to [address] for evaluation. My notes for this consultation are attached. If you have questions, please do not hesitate to call me. I look forward to following your patient along with you. Sincerely, Marni Ching MD

## 2020-10-02 NOTE — PROGRESS NOTES
[facility]  [address], [place]   Phone: [phone]  Fax: [phone]        PROGRESS NOTE      HISTORY OF PRESENT ILLNESS:  The patient is a 68 y.o. female and was seen today for follow up of pain localized to her neck. She was initially seen for neck pain intermittently extending into the LUE into all digits since April or May. Additionally, she endorses some strain in her low back when bending over. She was last seen by me 8/25/15 with c/o neck pain into the LUE into digits 4 and 5. At that time, her pain was well relieved with Neurontin. She was scheduled to f/u in 6 months time but failed to do so. She denies specific injury or trauma. She denies a hx of cervical surgery. She completed PT with good benefit.  She is inconsistent with her HEP. She treated with Tylenol extra strength prn with some relief. PmHx The patient has a history of DM and reports blood sugars are well controlled, consistently remaining below 200. Pt denies dropping things or loss of balance. Pt denies change in bowel or bladder habits. Pt denies fever, weight loss, or skin changes. The patient is RHD. C Spine MRI dated 8/21/17 reviewed. Per report, severe multilevel spondylosis worse at C3-C4 where there is mildly progressive high-grade central canal and bilateral foraminal stenosis. No cord signal abnormality. Additional high-grade foraminal stenosis on the right at C4-C5, bilaterally at C5-C6, and on the left at C6-C7 as well as additional areas of moderate foraminal stenosis as discussed. At her last clinical appointment, overall, she described her symptoms as manageable. I offered to refer her to PT to increase the flexibility for her low back, pt declined. Patient wished to continue her current treatment. I refilled her Neurontin 100 mg TID; however, I instructed her to decrease her Neurontin as tolerated.  I recommend she increase the frequency of HEP to daily.     The patient returns today with low back pain. She rates her pain 2-3/10, previously 0/10. She denies radicular symptoms. Her neck pain is doing well at this time. Pt decreased her Neurontin to 100 mg every day and was doing well until a couple weeks ago when she had a flare up. She increased her Neurontin back up to 100 mg BID. She is taking Naprosyn with benefit. Pt denies change in bowel or bladder habits. Pt denies h/o stomach ulcers or bleeding disorders. Patient denies current use of anticoagulants.  reviewed. Body mass index is 41.85 kg/m².     PCP: Trudy Tong MD      Past Medical History:   Diagnosis Date    Arthritis     Asthma     Cervical radiculopathy     Diabetes ([facility] [address]     Glaucoma     Hypercholesterolemia     Hypertension     Neck pain     Thyroid disease         Social History     Socioeconomic History    Marital status:      Spouse name: Not on file    Number of children: Not on file    Years of education: Not on file    Highest education level: Not on file   Occupational History    Not on file   Social Needs    Financial resource strain: Not on file    Food insecurity     Worry: Not on file     Inability: Not on file    Transportation needs     Medical: Not on file     Non-medical: Not on file   Tobacco Use    Smoking status: Former Smoker     Last attempt to quit: 1997     Years since quittin.7    Smokeless tobacco: Never Used   Substance and Sexual Activity    Alcohol use: No    Drug use: Not on file    Sexual activity: Not on file   Lifestyle    Physical activity     Days per week: Not on file     Minutes per session: Not on file    Stress: Not on file   Relationships    Social connections     Talks on phone: Not on file     Gets together: Not on file     Attends Hindu service: Not on file     Active member of club or organization: Not on file     Attends meetings of clubs or organizations: Not on file     Relationship status: Not on file   [address] [place] [address] Intimate partner violence     Fear of current or ex partner: Not on file     Emotionally abused: Not on file     Physically abused: Not on file     Forced sexual activity: Not on file   Other Topics Concern     Service Not Asked    Blood Transfusions Not Asked    Caffeine Concern Not Asked    Occupational Exposure Not Asked    Hobby Hazards Not Asked    Sleep Concern Not Asked    Stress Concern Not Asked    Weight Concern Not Asked    Special Diet Not Asked    Back Care Not Asked    Exercise Not Asked    Bike Helmet Not Asked   [address] Not Asked    Self-Exams Not Asked   Social History Narrative    Not on file       Current Outpatient Medications   Medication Sig Dispense Refill    gabapentin (NEURONTIN) 100 mg capsule Take 1 Cap by mouth three (3) times daily. Max Daily Amount: 300 mg. 270 Cap 1    losartan (COZAAR) 25 mg tablet TAKE 1 TABLET EVERY DAY      NAPROXEN PO Take  by mouth.  gabapentin (NEURONTIN) 100 mg capsule Take 1 Cap by mouth three (3) times daily. Max Daily Amount: 300 mg. 270 Cap 1    gabapentin (NEURONTIN) 100 mg capsule Take 1 Cap by mouth three (3) times daily. 270 Cap 1    aspirin 81 mg chewable tablet Take 81 mg by mouth daily.  gabapentin (NEURONTIN) 100 mg capsule Take 1 Cap by mouth three (3) times daily. 270 Cap 1    cholecalciferol, vitamin D3, (VITAMIN D3 PO) Take  by mouth.  aspirin-calcium carbonate 81 mg-300 mg calcium(777 mg) tab 81 mg.      gabapentin (NEURONTIN) 100 mg capsule Take 1 Cap by mouth three (3) times daily. 90 Cap 1    pravastatin (PRAVACHOL) 20 mg tablet Take 20 mg by mouth nightly.  levothyroxine (SYNTHROID) 100 mcg tablet Take  by mouth Daily (before breakfast).  SITAGLIPTIN PHOS/METFORMIN HCL (JANUMET PO) Take  by mouth.  valsartan-hydrochlorothiazide (DIOVAN-HCT) 80-12.5 mg per tablet Take 1 Tab by mouth daily.  FLUTICASONE PROPIONATE (FLONASE NA) by Nasal route.       FEXOFENADINE HCL (ALLEGRA PO) Take  by mouth. No Known Allergies       PHYSICAL EXAMINATION    Visit Vitals  BP (!) 143/83 (BP 1 Location: Left arm, BP Patient Position: Sitting)   Pulse 70   Temp 97 °F (36.1 °C) (Temporal)   Wt 243 lb 12.8 oz (110.6 kg)   SpO2 98%   BMI 41.85 kg/m²       CONSTITUTIONAL: NAD, A&O x 3  SENSATION: Intact to light touch throughout  NEURO: Anup's is negative bilaterally. RANGE OF MOTION: The patient has full passive range of motion in all four extremities. MOTOR:  Straight Leg Raise: Negative, bilateral       Shoulder AB/Flex Elbow Flex Wrist Ext Elbow Ext Wrist Flex Hand Intrin Tone   Right +4/5 +4/5 +4/5 +4/5 +4/5 +4/5 +4/5   Left +4/5 +4/5 +4/5 +4/5 +4/5 +4/5 +4/5              Hip Flex Knee Ext Knee Flex Ankle DF GTE Ankle PF Tone   Right +4/5 +4/5 +4/5 +4/5 +4/5 +4/5 +4/5   Left +4/5 +4/5 +4/5 +4/5 +4/5 +4/5 +4/5     RADIOGRAPHS  Preliminary reading of L spine plain films dated 10/5/2020 revealed:  Mild dextrorotatory scoliosis convex to the left. Small anterior osteophytes noted at L1-L5. No acute pathology identified. These are being sent out for official reading by Dr. Sb Steven. ASSESSMENT   Diagnoses and all orders for this visit:    1. Cervical stenosis of spine    2. Cervical spondylosis without myelopathy    3. DDD (degenerative disc disease), cervical    4. DDD (degenerative disc disease), lumbar    5. Strain of lumbar region, initial encounter    6. Lumbosacral spondylosis without myelopathy        IMPRESSION AND PLAN:  Patient returns to the office today with c/o low back pain. Multiple treatment options were discussed. I advised her to wean the Neurontin 100 mg as tolerated. I offered a referral to PT, pt declined. She did not think her remaining pain complaints were severe enough to warrant additional workup/treatment at this time. Patient is neurologically intact. I will see the patient back prn.        Written by Vic Landry, as dictated by Rommel Marie Clarence Roper MD  I examined the patient, reviewed and agree with the note.

## 2020-10-05 ENCOUNTER — OFFICE VISIT (OUTPATIENT)
Dept: ORTHOPEDIC SURGERY | Age: 70
End: 2020-10-05
Payer: MEDICARE

## 2020-10-05 VITALS
BODY MASS INDEX: 41.85 KG/M2 | HEART RATE: 70 BPM | SYSTOLIC BLOOD PRESSURE: 143 MMHG | TEMPERATURE: 97 F | WEIGHT: 243.8 LBS | OXYGEN SATURATION: 98 % | DIASTOLIC BLOOD PRESSURE: 83 MMHG

## 2020-10-05 DIAGNOSIS — M50.30 DDD (DEGENERATIVE DISC DISEASE), CERVICAL: ICD-10-CM

## 2020-10-05 DIAGNOSIS — M47.817 LUMBOSACRAL SPONDYLOSIS WITHOUT MYELOPATHY: ICD-10-CM

## 2020-10-05 DIAGNOSIS — M47.812 CERVICAL SPONDYLOSIS WITHOUT MYELOPATHY: ICD-10-CM

## 2020-10-05 DIAGNOSIS — S39.012A STRAIN OF LUMBAR REGION, INITIAL ENCOUNTER: ICD-10-CM

## 2020-10-05 DIAGNOSIS — M51.36 DDD (DEGENERATIVE DISC DISEASE), LUMBAR: ICD-10-CM

## 2020-10-05 DIAGNOSIS — M48.02 CERVICAL STENOSIS OF SPINE: Primary | ICD-10-CM

## 2020-10-05 PROCEDURE — 72100 X-RAY EXAM L-S SPINE 2/3 VWS: CPT | Performed by: PHYSICAL MEDICINE & REHABILITATION

## 2020-10-05 PROCEDURE — 99213 OFFICE O/P EST LOW 20 MIN: CPT | Performed by: PHYSICAL MEDICINE & REHABILITATION

## 2020-10-05 NOTE — LETTER
10/5/20 Patient: Eduardo Walker YOB: 1950 Date of Visit: 10/5/2020 Elizabeth Harris MD 
[address] [facility] [address] [address] [facility] [address] VIA Facsimile: [phone] Dear Elizabeth Harris MD, Thank you for referring Ms. Valerie Santos to [address] for evaluation. My notes for this consultation are attached. If you have questions, please do not hesitate to call me. I look forward to following your patient along with you. Sincerely, Ayana Barber MD

## 2022-03-18 PROBLEM — M54.12 CERVICAL NEURITIS: Status: ACTIVE | Noted: 2018-07-11

## 2022-03-18 PROBLEM — M50.20 DISPLACEMENT OF CERVICAL INTERVERTEBRAL DISC WITHOUT MYELOPATHY: Status: ACTIVE | Noted: 2018-07-11

## 2022-03-19 PROBLEM — M50.30 DDD (DEGENERATIVE DISC DISEASE), CERVICAL: Status: ACTIVE | Noted: 2018-07-11

## 2022-03-19 PROBLEM — M48.02 CERVICAL STENOSIS OF SPINE: Status: ACTIVE | Noted: 2018-07-11

## 2022-03-19 PROBLEM — M47.812 CERVICAL SPONDYLOSIS WITHOUT MYELOPATHY: Status: ACTIVE | Noted: 2018-07-11

## 2025-08-26 ENCOUNTER — OFFICE VISIT (OUTPATIENT)
Age: 75
End: 2025-08-26
Payer: MEDICARE

## 2025-08-26 VITALS
OXYGEN SATURATION: 96 % | WEIGHT: 224 LBS | BODY MASS INDEX: 38.24 KG/M2 | HEIGHT: 64 IN | TEMPERATURE: 98 F | HEART RATE: 56 BPM

## 2025-08-26 DIAGNOSIS — M54.2 CERVICAL PAIN: Primary | ICD-10-CM

## 2025-08-26 DIAGNOSIS — M50.30 DDD (DEGENERATIVE DISC DISEASE), CERVICAL: ICD-10-CM

## 2025-08-26 DIAGNOSIS — M47.812 CERVICAL SPONDYLOSIS WITHOUT MYELOPATHY: ICD-10-CM

## 2025-08-26 PROCEDURE — 99204 OFFICE O/P NEW MOD 45 MIN: CPT | Performed by: PHYSICAL MEDICINE & REHABILITATION

## 2025-08-26 PROCEDURE — 1123F ACP DISCUSS/DSCN MKR DOCD: CPT | Performed by: PHYSICAL MEDICINE & REHABILITATION

## 2025-08-26 PROCEDURE — 72040 X-RAY EXAM NECK SPINE 2-3 VW: CPT | Performed by: PHYSICAL MEDICINE & REHABILITATION

## 2025-08-26 PROCEDURE — 1159F MED LIST DOCD IN RCRD: CPT | Performed by: PHYSICAL MEDICINE & REHABILITATION

## 2025-08-26 PROCEDURE — 1125F AMNT PAIN NOTED PAIN PRSNT: CPT | Performed by: PHYSICAL MEDICINE & REHABILITATION

## 2025-08-26 PROCEDURE — 1160F RVW MEDS BY RX/DR IN RCRD: CPT | Performed by: PHYSICAL MEDICINE & REHABILITATION

## 2025-08-26 RX ORDER — GABAPENTIN 100 MG/1
100 CAPSULE ORAL 3 TIMES DAILY
Qty: 270 CAPSULE | Refills: 1 | Status: SHIPPED | OUTPATIENT
Start: 2025-08-26 | End: 2026-02-22

## 2025-08-26 RX ORDER — SITAGLIPTIN AND METFORMIN HYDROCHLORIDE 500; 50 MG/1; MG/1
TABLET, FILM COATED ORAL
COMMUNITY
Start: 2025-08-08